# Patient Record
Sex: MALE | Race: WHITE | NOT HISPANIC OR LATINO | Employment: FULL TIME | URBAN - NONMETROPOLITAN AREA
[De-identification: names, ages, dates, MRNs, and addresses within clinical notes are randomized per-mention and may not be internally consistent; named-entity substitution may affect disease eponyms.]

---

## 2023-06-14 ENCOUNTER — NON-PROVIDER VISIT (OUTPATIENT)
Dept: URGENT CARE | Facility: PHYSICIAN GROUP | Age: 43
End: 2023-06-14

## 2023-06-14 DIAGNOSIS — Z02.1 PRE-EMPLOYMENT DRUG SCREENING: ICD-10-CM

## 2023-06-14 LAB
AMP AMPHETAMINE: NORMAL
COC COCAINE: NORMAL
INT CON NEG: NORMAL
INT CON POS: NORMAL
MET METHAMPHETAMINES: NORMAL
OPI OPIATES: NORMAL
PCP PHENCYCLIDINE: NORMAL
POC DRUG COMMENT 753798-OCCUPATIONAL HEALTH: NORMAL
THC: NORMAL

## 2023-06-14 PROCEDURE — 80305 DRUG TEST PRSMV DIR OPT OBS: CPT | Performed by: PHYSICIAN ASSISTANT

## 2023-06-28 ENCOUNTER — OCCUPATIONAL MEDICINE (OUTPATIENT)
Dept: URGENT CARE | Facility: PHYSICIAN GROUP | Age: 43
End: 2023-06-28
Payer: COMMERCIAL

## 2023-06-28 VITALS
WEIGHT: 165 LBS | SYSTOLIC BLOOD PRESSURE: 120 MMHG | OXYGEN SATURATION: 97 % | DIASTOLIC BLOOD PRESSURE: 82 MMHG | HEART RATE: 61 BPM | RESPIRATION RATE: 14 BRPM | HEIGHT: 71 IN | TEMPERATURE: 97.8 F | BODY MASS INDEX: 23.1 KG/M2
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
## 2023-06-28 DIAGNOSIS — K08.409 LOSS OF SINGLE TOOTH: ICD-10-CM

## 2023-06-28 PROCEDURE — 3079F DIAST BP 80-89 MM HG: CPT | Performed by: STUDENT IN AN ORGANIZED HEALTH CARE EDUCATION/TRAINING PROGRAM

## 2023-06-28 PROCEDURE — 3074F SYST BP LT 130 MM HG: CPT | Performed by: STUDENT IN AN ORGANIZED HEALTH CARE EDUCATION/TRAINING PROGRAM

## 2023-06-28 PROCEDURE — 99203 OFFICE O/P NEW LOW 30 MIN: CPT | Performed by: STUDENT IN AN ORGANIZED HEALTH CARE EDUCATION/TRAINING PROGRAM

## 2023-06-28 NOTE — LETTER
"   [facility] [address] [place] [address]  Phone:  [phone] - Fax:  [phone]   [facility] Progress Report and Disability Certification  Date of Service: 6/28/2023   No Show:  No  Date / Time of Next Visit:     Claim Information   Patient Name: Keith Brian  Claim Number:     Employer: [employer]  Date of Injury: 6/26/2023     Insurer / TPA: [facility]  ID / SSN:     Occupation:   Diagnosis: Diagnoses of Loss of single tooth and Work related injury were pertinent to this visit.    Medical Information   Related to Industrial Injury?      Subjective Complaints:  DOI: 6/26/23  NISSA: Box hit tooth and tooth \"popped out\"  Patient reports pain on proximal palate and to lift in place on shelf.  Patient states corner of the box hit left lower jaw causing tooth to pop out.  Patient does have tenderness to area that box hit.  Denies any fever/chill. No drainage from tooth. No second job. No prior injury.   Objective Findings: Lips: Pink.   Mouth: Mucous membranes are moist.   Dentition: Abnormal dentition. Gingival swelling and dental caries present. Missing R lateral incisor.   Pharynx: Oropharynx is clear. Uvula midline.      Pre-Existing Condition(s):     Assessment:   Initial Visit    Status: Discharged / Care Transfer  Permanent Disability:     Plan:      Diagnostics:      Comments:       Disability Information   Status: Released to Full Duty    From:  6/28/2023  Through:   Restrictions are:     Physical Restrictions   Sitting:    Standing:    Stooping:    Bending:      Squatting:    Walking:    Climbing:    Pushing:      Pulling:    Other:    Reaching Above Shoulder (L):   Reaching Above Shoulder (R):       Reaching Below Shoulder (L):    Reaching Below Shoulder (R):      Not to exceed Weight Limits   Carrying(hrs):   Weight Limit(lb):   Lifting(hrs):   Weight  Limit(lb):     Comments: Recommend mouth guard. Transfer of care " to occupational medicine.  Referral placed for occupational medicine and dentistry.  Supportive care measures discussed.    Repetitive Actions   Hands: i.e. Fine Manipulations from Grasping:     Feet: i.e. Operating Foot Controls:     Driving / Operate Machinery:     Health Care Provider’s Original or Electronic Signature  Catherine Gonsalves P.A.-C. Health Care Provider’s Original or Electronic Signature    Amadou Jay DO MPH     Clinic Name / Location: [address] [address] Clinic Phone Number: Dept: [phone]   Appointment Time: 11:45 Am Visit Start Time: 12:33 PM   Check-In Time:  11:59 Am Visit Discharge Time: 12:55 PM   Original-Treating Physician or Chiropractor    Page 2-Insurer/TPA    Page 3-Employer    Page 4-Employee

## 2023-06-28 NOTE — LETTER
"EMPLOYEE’S CLAIM FOR COMPENSATION/ REPORT OF INITIAL TREATMENT  FORM C-4  PLEASE TYPE OR PRINT    EMPLOYEE’S CLAIM - PROVIDE ALL INFORMATION REQUESTED   First Name  Keith Last Name  Snehal Birthdate                    1980                Sex  male Claim Number (Insurer’s Use Only)   Home Address  [place] Age  42 y.o. Height  1.803 m (5' 11\") Weight  74.8 kg (165 lb) [facility]     [facility] Zip  [address] Telephone  There are no phone numbers on file.   Mailing Address  [address] [facility] Zip  [address] Primary Language Spoken  English    INSURER   THIRD-PARTY     [employer]   Employee's Occupation (Job Title) When Injury or Occupational Disease Occurred      Employer's Name/Company Name  [employer]  Telephone  [phone]    Office Mail Address (Number and Street)  [address]        Date of Injury  6/26/2023               Hours Injury  5:20 PM Date Employer Notified  6/26/2023 Last Day of Work after Injury or Occupational Disease  6/28/2023 Supervisor to Whom Injury     Reported  Justice Weber   Address or Location of Accident (if applicable)  Work [1]   What were you doing at the time of accident? (if applicable)  Lifting box onto pallet    How did this injury or occupational disease occur? (Be specific and answer in detail. Use additional sheet if necessary)  I picked u 60lbs box to put on 5ft pallet, The corner of the box hit my face chin, I tasted blood then spit my tooth out.   If you believe that you have an occupational disease, when did you first have knowledge of the disability and its relationship to your employment?  NA Witnesses to the Accident (if applicable)  [place] (Coworker) Justice Wiseman      Nature of Injury or Occupational Disease  Workers' Compensation  Part(s) of Body Injured or Affected  Teeth, Mouth, N/A    I CERTIFY THAT THE ABOVE IS TRUE AND " CORRECT TO T HE BEST OF MY KNOWLEDGE AND THAT I HAVE PROVIDED THIS INFORMATION IN ORDER TO OBTAIN THE BENEFITS OF [place], OR [place]).  I HEREBY AUTHORIZE ANY PHYSICIAN, CHIROPRACTOR, SURGEON, PRACTITIONER OR ANY OTHER PERSON, ANY HOSPITAL, INCLUDING [facility] OR [facility], ANY  MEDICAL SERVICE ORGANIZATION, ANY INSURANCE COMPANY, OR OTHER INSTITUTION OR ORGANIZATION TO RELEASE TO EACH OTHER, ANY MEDICAL OR OTHER INFORMATION, INCLUDING BENEFITS PAID OR PAYABLE, PERTINENT TO THIS INJURY OR DISEASE, EXCEPT INFORMATION RELATIVE TO DIAGNOSIS, TREATMENT AND/OR COUNSELING FOR AIDS, PSYCHOLOGICAL CONDITIONS, ALCOHOL OR CONTROLLED SUBSTANCES, FOR WHICH I MUST GIVE SPECIFIC AUTHORIZATION.  A PHOTOSTAT OF THIS AUTHORIZATION SHALL BE VALID AS THE ORIGINAL.       Date 06/28/2023   [facility] Employee’s Original or  *Electronic Signature   THIS REPORT MUST BE COMPLETED AND MAILED WITHIN 3 WORKING DAYS OF TREATMENT   Place  [facility]  Name of Facility  [facility]   Date  6/28/2023 Diagnosis and Description of Injury or Occupational Disease  (K08.409) Loss of single tooth  (Y99.0) Work related injury Is there evidence that the injured employee was under the influence of alcohol and/or another controlled substance at the time of accident?  ? No ? Yes (if yes, please explain)   Hour  12:33 PM   Diagnoses of Loss of single tooth and Work related injury were pertinent to this visit. No   Treatment     Have you advised the patient to remain off work five days or     more?    X-Ray Findings      ? Yes Indicate dates:   From   To      From information given by the employee, together with medical evidence, can        you directly connect this injury or occupational disease as job incurred?  Yes ? No If no, is the injured employee capable of:  ? full duty  Yes ? modified duty      Is additional  "medical care by a physician indicated?  Yes If modified duty, specify any limitations / restrictions      Do you know of any previous injury or disease contributing to this condition or occupational disease?  ? Yes ? No (Explain if yes)                          No   Date  6/28/2023 Print Health Care Provider's  Name  Catherine Gonsalves P.A.-C. I certify that the employer’s copy of  this form was delivered to the employer on:   Address  [address] Insurer’s Use Only     [facility] Zip  [address]    Provider’s Tax ID Number  [id number] Telephone  Dept: [phone]             Health Care Provider’s Original or Electronic Signature  e-CATHERINE Vega P.A.-C. Degree (MD,DO, DC,JOSE EDUARDO,APRN)  JOSE EDUARDO      * Complete and attach Release of Information (Form C-4A) when injured employee signs C-4 Form electronically  ORIGINAL - TREATING HEALTHCARE PROVIDER PAGE 2 - INSURER/TPA PAGE 3 - EMPLOYER PAGE 4 - EMPLOYEE             Form C-4 (rev.08/21)           BRIEF DESCRIPTION OF RIGHTS AND BENEFITS  (Pursuant to [place] 616C.050)    Notice of Injury or Occupational Disease (Incident Report Form C-1): If an injury or occupational disease (OD) arises out of and in the course of employment, you must provide written notice to your employer as soon as practicable, but no later than 7 days after the accident or OD. Your employer shall maintain a sufficient supply of the required forms.    Claim for Compensation (Form C-4): If medical treatment is sought, the form C-4 is available at the place of initial treatment. A completed \"Claim for Compensation\" (Form C-4) must be filed within 90 days after an accident or OD. The treating physician or chiropractor must, within 3 working days after treatment, complete and mail to the employer, the employer's insurer and third-party , the Claim for Compensation.    Medical Treatment: If you require medical treatment for your on-the-job injury or OD, you may be required to " select a physician or chiropractor from a list provided by your workers’ compensation insurer, if it has contracted with an Organization for Managed Care (MCO) or Preferred Provider Organization (PPO) or providers of health care. If your employer has not entered into a contract with an MCO or PPO, you may select a physician or chiropractor from the Panel of Physicians and Chiropractors. Any medical costs related to your industrial injury or OD will be paid by your insurer.    Temporary Total Disability (TTD): If your doctor has certified that you are unable to work for a period of at least 5 consecutive days, or 5 cumulative days in a 20-day period, or places restrictions on you that your employer does not accommodate, you may be entitled to TTD compensation.    Temporary Partial Disability (TPD): If the wage you receive upon reemployment is less than the compensation for TTD to which you are entitled, the insurer may be required to pay you TPD compensation to make up the difference. TPD can only be paid for a maximum of 24 months.    Permanent Partial Disability (PPD): When your medical condition is stable and there is an indication of a PPD as a result of your injury or OD, within 30 days, your insurer must arrange for an evaluation by a rating physician or chiropractor to determine the degree of your PPD. The amount of your PPD award depends on the date of injury, the results of the PPD evaluation, your age and wage.    Permanent Total Disability (PTD): If you are medically certified by a treating physician or chiropractor as permanently and totally disabled and have been granted a PTD status by your insurer, you are entitled to receive monthly benefits not to exceed 66 2/3% of your average monthly wage. The amount of your PTD payments is subject to reduction if you previously received a lump-sum PPD award.    Vocational Rehabilitation Services: You may be eligible for vocational rehabilitation services if you  are unable to return to the job due to a permanent physical impairment or permanent restrictions as a result of your injury or occupational disease.    Transportation and Per Heather Reimbursement: You may be eligible for travel expenses and per heather associated with medical treatment.    Reopening: You may be able to reopen your claim if your condition worsens after claim closure.     Appeal Process: If you disagree with a written determination issued by the insurer or the insurer does not respond to your request, you may appeal to the Department of Administration, , by following the instructions contained in your determination letter. You must appeal the determination within 70 days from the date of the determination letter at [address], [address], [place], [place] [address], or [address], [address], [place], [place] [address]. If you disagree with the  decision, you may appeal to the Department of Administration, . You must file your appeal within 30 days from the date of the  decision letter at [address], [address], [place], [place] [address], or [address], [facility] [place] [address], [place] [address]. If you disagree with a decision of an , you may file a petition for judicial review with the District Court. You must do so within 30 days of the Appeal Officer’s decision. You may be represented by an  at your own expense or you may contact the [facility] for possible representation.    [place]  for Injured Workers (NAIW): If you disagree with a  decision, you may request that NAIW represent you without charge at an  Hearing. For information regarding denial of benefits, you may contact the [facility] at: [address], [address] [address], [phone], or [address], [address], [place] [address], [phone]    To File a Complaint with  the Division: If you wish to file a complaint with the  of the Division of Industrial Relations (DIR),  please contact the Workers’ Compensation Section, [address], [address], [place], [place] [address], telephone ([phone], or [address], [address], [place], [place] [address], telephone [phone].    For assistance with Workers’ Compensation Issues: You may contact the [facility] Office for Consumer Health Assistance, [address], [address], [place], [place] [address], Toll Free 1-654.208.4655, Web site: http://[facility].[place].gov/Programs/ANALIA E-mail: analia@[facility].nv.gov              __________________________________________________________________                                    ___06/28/2023______            Employee Name / Signature                                                                                                                            Date                                                                                                                                                                                                                              D-2 (rev. 10/20)

## 2023-06-28 NOTE — PROGRESS NOTES
"Caren Brian is a 42 y.o. male who presents with Work-Related Injury (Lost tooth )            DOI: 6/26/23  NISSA: Box hit tooth and tooth \"popped out\"  Patient reports pain on proximal palate and to lift in place on shelf.  Patient states corner of the box hit left lower jaw causing tooth to pop out.  Patient does have tenderness to area that box hit.  Denies any fever/chill. No drainage from tooth. No second job. No prior injury.      HPI    ROS           Objective     BP (!) 120/92   Pulse (!) 51   Temp 36.6 °C (97.8 °F) (Temporal)   Resp 14   Ht 1.803 m (5' 11\")   Wt 74.8 kg (165 lb)   SpO2 97%   BMI 23.01 kg/m²      Physical Exam  Vitals reviewed.   Constitutional:       Appearance: Normal appearance.   HENT:      Head: Normocephalic and atraumatic.      Mouth/Throat:      Lips: Pink.      Mouth: Mucous membranes are moist.      Dentition: Abnormal dentition. Gingival swelling and dental caries present.      Pharynx: Oropharynx is clear. Uvula midline.        Comments: Missing R lateral incisor.  Cardiovascular:      Rate and Rhythm: Normal rate and regular rhythm.   Pulmonary:      Effort: Pulmonary effort is normal.      Breath sounds: Normal breath sounds.   Neurological:      Mental Status: He is alert.                           Assessment & Plan        1. Loss of single tooth  - Loss of right lateral incisor.  - Referral to Dentistry  - Referral to Occupational Medicine    2. Work related injury  - Referral to Dentistry  - Referral to Occupational Medicine     C4 and D39 completed.  Released to full duty.  Recommend mouth guard.  Transfer of care to occupational medicine and dentistry.  Referrals placed.    Supportive care measures (ice, OTC Tylenol/ibuprofen) and indications for immediate follow-up discussed with patient. Pathogenesis of diagnosis discussed including typical length and natural progression.      Instructed to return to urgent care or nearest emergency department if " symptoms fail to improve, for any change in condition, further concerns, or new concerning symptoms.    Patient states understanding and agrees with the plan of care and discharge instructions.

## 2023-07-07 ENCOUNTER — OCCUPATIONAL MEDICINE (OUTPATIENT)
Dept: OCCUPATIONAL MEDICINE | Facility: CLINIC | Age: 43
End: 2023-07-07
Payer: COMMERCIAL

## 2023-07-07 VITALS
OXYGEN SATURATION: 96 % | SYSTOLIC BLOOD PRESSURE: 116 MMHG | BODY MASS INDEX: 23.01 KG/M2 | HEART RATE: 67 BPM | DIASTOLIC BLOOD PRESSURE: 70 MMHG | RESPIRATION RATE: 14 BRPM | WEIGHT: 165 LBS | TEMPERATURE: 98.5 F

## 2023-07-07 DIAGNOSIS — K08.409 LOSS OF SINGLE TOOTH: ICD-10-CM
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
## 2023-07-07 PROCEDURE — 3078F DIAST BP <80 MM HG: CPT | Performed by: NURSE PRACTITIONER

## 2023-07-07 PROCEDURE — 3074F SYST BP LT 130 MM HG: CPT | Performed by: NURSE PRACTITIONER

## 2023-07-07 PROCEDURE — 99213 OFFICE O/P EST LOW 20 MIN: CPT | Performed by: NURSE PRACTITIONER

## 2023-07-07 NOTE — PROGRESS NOTES
"Subjective:     Keith Brian is a 42 y.o. male who presents for No chief complaint on file.      HPI  Pt presents for evaluation of an existing work comp injury.  This is his first follow-up visit since his injury.  Copied from previous visit:    DOI: 6/26/23  NISSA: Box hit tooth and tooth \"popped out\"  Patient reports pain on proximal palate and to lift in place on shelf.  Patient states corner of the box hit left lower jaw causing tooth to pop out.  Patient does have tenderness to area that box hit.  Denies any fever/chill. No drainage from tooth. No second job. No prior injury.    Follow-up visit #1, 7/7/2023: Patient no longer endorses pain but does note mild pressure to chin and area where tooth was lost.  He has not used any ibuprofen or Tylenol for relief of his discomfort.  He is back to work without any restrictions.  He is tolerating this well.  ROS    PMH: No past medical history on file.  ALLERGIES: No Known Allergies  SURGHX: No past surgical history on file.  SOCHX:   Social History     Socioeconomic History    Marital status: Single   Tobacco Use    Smoking status: Every Day     Types: Cigarettes    Smokeless tobacco: Never   Substance and Sexual Activity    Alcohol use: Never    Drug use: Yes     Types: Marijuana     FH: No family history on file.      Objective:   There were no vitals taken for this visit.    Physical Exam  Vitals and nursing note reviewed.   Constitutional:       General: He is not in acute distress.     Appearance: Normal appearance. He is normal weight. He is not ill-appearing or toxic-appearing.   HENT:      Head: Normocephalic.      Right Ear: External ear normal.      Left Ear: External ear normal.      Nose: Nose normal.      Mouth/Throat:      Mouth: Mucous membranes are moist.      Dentition: Abnormal dentition. Does not have dentures. No gingival swelling or dental abscesses.        Comments: Lateral incisor #26 missing  Eyes:      General:         Right eye: No discharge.    "      Left eye: No discharge.      Extraocular Movements: Extraocular movements intact.      Conjunctiva/sclera: Conjunctivae normal.      Pupils: Pupils are equal, round, and reactive to light.   Pulmonary:      Effort: Pulmonary effort is normal.      Breath sounds: Normal breath sounds.   Abdominal:      General: Abdomen is flat.   Musculoskeletal:         General: Normal range of motion.      Cervical back: Normal range of motion and neck supple. No rigidity.   Lymphadenopathy:      Cervical: No cervical adenopathy.   Skin:     General: Skin is warm and dry.   Neurological:      General: No focal deficit present.      Mental Status: He is alert and oriented to person, place, and time. Mental status is at baseline.   Psychiatric:         Mood and Affect: Mood normal.         Behavior: Behavior normal.         Judgment: Judgment normal.         Assessment/Plan:   Assessment    1. Loss of single tooth        2. Work related injury            Referral for dentistry is in place.  Patient is currently awaiting to hear from dentist office.  He may return to work full max duty with no restrictions.  Patient to follow-up in 2 weeks.  Follow-up sooner for signs and symptoms of infection.

## 2023-07-07 NOTE — LETTER
"[address],   [address] [place] [address]  Phone:  [phone] - Fax:  [phone]   Occupational Health [facility] Progress Report and Disability Certification  Date of Service: 7/7/2023   No Show:  No  Date / Time of Next Visit:  @2:30 PM   Claim Information   Patient Name: Keith Brian  Claim Number:     Employer: [employer]  Date of Injury: 6/26/2023     Insurer / TPA: [employer]  ID / SSN:     Occupation:   Diagnosis: Diagnoses of Loss of single tooth and Work related injury were pertinent to this visit.    Medical Information   Related to Industrial Injury? Yes    Subjective Complaints:  Pt presents for evaluation of an existing work comp injury.  This is his first follow-up visit since his injury.  Copied from previous visit:    DOI: 6/26/23  NISSA: Box hit tooth and tooth \"popped out\"  Patient reports pain on proximal palate and to lift in place on shelf.  Patient states corner of the box hit left lower jaw causing tooth to pop out.  Patient does have tenderness to area that box hit.  Denies any fever/chill. No drainage from tooth. No second job. No prior injury.    Follow-up visit #1, 7/7/2023: Patient no longer endorses pain but does note mild pressure to chin and area where tooth was lost.  He has not used any ibuprofen or Tylenol for relief of his discomfort.  He is back to work without any restrictions.  He is tolerating this well.   Objective Findings: Dentition: Abnormal dentition. Does not have dentures. No gingival swelling or dental abscesses.    Comments: Lateral incisor #26 missing     Pre-Existing Condition(s):     Assessment:   Condition Same    Status: Additional Care Required  Permanent Disability:No    Plan:      Diagnostics:      Comments:       Disability Information   Status: Released to Full Duty    From:     Through:   Restrictions are:     Physical Restrictions   Sitting:    Standing:    Stooping:    Bending:   "    Squatting:    Walking:    Climbing:    Pushing:      Pulling:    Other:    Reaching Above Shoulder (L):   Reaching Above Shoulder (R):       Reaching Below Shoulder (L):    Reaching Below Shoulder (R):      Not to exceed Weight Limits   Carrying(hrs):   Weight Limit(lb):   Lifting(hrs):   Weight  Limit(lb):     Comments: Patient follow-up in 2 weeks or sooner for signs and symptoms of infection.    Repetitive Actions   Hands: i.e. Fine Manipulations from Grasping:     Feet: i.e. Operating Foot Controls:     Driving / Operate Machinery:     Health Care Provider’s Original or Electronic Signature  MINI Jameson Health Care Provider’s Original or Electronic Signature    Amadou Jay DO MPH     Clinic Name / Location: [address] [place] [address] Clinic Phone Number: Dept: [phone]   Appointment Time: 1:30 Pm Visit Start Time: 1:45 PM   Check-In Time:  1:17 Pm Visit Discharge Time:  3:18 PM   Original-Treating Physician or Chiropractor    Page 2-Insurer/TPA    Page 3-Employer    Page 4-Employee

## 2023-07-07 NOTE — LETTER
"[address],   [address] [address]  Phone:  [phone] - Fax:  [phone]   Occupational Health [facility] Progress Report and Disability Certification  Date of Service: 7/7/2023   No Show:  No  Date / Time of Next Visit:  07/21/2023   Claim Information   Patient Name: Keith Brian  Claim Number:     Employer: [employer]  Date of Injury: 6/26/2023     Insurer / TPA: [employer]  ID / SSN:     Occupation:   Diagnosis: Diagnoses of Loss of single tooth and Work related injury were pertinent to this visit.    Medical Information   Related to Industrial Injury? Yes    Subjective Complaints:  Pt presents for evaluation of an existing work comp injury.  This is his first follow-up visit since his injury.  Copied from previous visit:    DOI: 6/26/23  NISSA: Box hit tooth and tooth \"popped out\"  Patient reports pain on proximal palate and to lift in place on shelf.  Patient states corner of the box hit left lower jaw causing tooth to pop out.  Patient does have tenderness to area that box hit.  Denies any fever/chill. No drainage from tooth. No second job. No prior injury.    Follow-up visit #1, 7/7/2023: Patient no longer endorses pain but does note mild pressure to chin and area where tooth was lost.  He has not used any ibuprofen or Tylenol for relief of his discomfort.  He is back to work without any restrictions.  He is tolerating this well.   Objective Findings: Dentition: Abnormal dentition. Does not have dentures. No gingival swelling or dental abscesses.    Comments: Lateral incisor #26 missing     Pre-Existing Condition(s):     Assessment:   Condition Same    Status: Additional Care Required  Permanent Disability:No    Plan:      Diagnostics:      Comments:       Disability Information   Status: Released to Full Duty    From:     Through:   Restrictions are:     Physical Restrictions   Sitting:    Standing:    Stooping:    " Bending:      Squatting:    Walking:    Climbing:    Pushing:      Pulling:    Other:    Reaching Above Shoulder (L):   Reaching Above Shoulder (R):       Reaching Below Shoulder (L):    Reaching Below Shoulder (R):      Not to exceed Weight Limits   Carrying(hrs):   Weight Limit(lb):   Lifting(hrs):   Weight  Limit(lb):     Comments: Patient follow-up in 2 weeks or sooner for signs and symptoms of infection.    Repetitive Actions   Hands: i.e. Fine Manipulations from Grasping:     Feet: i.e. Operating Foot Controls:     Driving / Operate Machinery:     Health Care Provider’s Original or Electronic Signature  MINI Jameson Health Care Provider’s Original or Electronic Signature    Amadou Jay DO MPH     Clinic Name / Location: [place] [address]  [place] [address] Clinic Phone Number: Dept: [phone]   Appointment Time: 1:30 Pm Visit Start Time: 1:45 PM   Check-In Time:  1:17 Pm Visit Discharge Time:  2:16pm   Original-Treating Physician or Chiropractor    Page 2-Insurer/TPA    Page 3-Employer    Page 4-Employee

## 2024-06-07 ENCOUNTER — OFFICE VISIT (OUTPATIENT)
Dept: URGENT CARE | Facility: PHYSICIAN GROUP | Age: 44
End: 2024-06-07
Payer: MEDICAID

## 2024-06-07 VITALS
SYSTOLIC BLOOD PRESSURE: 114 MMHG | DIASTOLIC BLOOD PRESSURE: 60 MMHG | BODY MASS INDEX: 23.31 KG/M2 | WEIGHT: 157.4 LBS | OXYGEN SATURATION: 98 % | TEMPERATURE: 98.4 F | HEART RATE: 52 BPM | HEIGHT: 69 IN | RESPIRATION RATE: 20 BRPM

## 2024-06-07 DIAGNOSIS — S09.93XA PAIN DUE TO DENTAL TRAUMA: ICD-10-CM

## 2024-06-07 DIAGNOSIS — K04.7 DENTAL INFECTION: ICD-10-CM

## 2024-06-07 PROCEDURE — 3074F SYST BP LT 130 MM HG: CPT | Performed by: NURSE PRACTITIONER

## 2024-06-07 PROCEDURE — 3078F DIAST BP <80 MM HG: CPT | Performed by: NURSE PRACTITIONER

## 2024-06-07 PROCEDURE — 99215 OFFICE O/P EST HI 40 MIN: CPT | Performed by: NURSE PRACTITIONER

## 2024-06-07 RX ORDER — HYDROCODONE BITARTRATE AND ACETAMINOPHEN 5; 325 MG/1; MG/1
1 TABLET ORAL EVERY 4 HOURS PRN
Qty: 15 TABLET | Refills: 0 | Status: SHIPPED | OUTPATIENT
Start: 2024-06-07 | End: 2024-06-12

## 2024-06-07 RX ORDER — CEPHALEXIN 500 MG/1
500 CAPSULE ORAL 4 TIMES DAILY
Qty: 28 CAPSULE | Refills: 0 | Status: SHIPPED | OUTPATIENT
Start: 2024-06-07 | End: 2024-06-14

## 2024-06-08 NOTE — PROGRESS NOTES
"Subjective:     Keith Brian is a 43 y.o. male who presents for Dental Injury (Pt sts he had 30 teeth removed today at 1 pm, pt sts he was told to keep the dentures in for 24 hours and they fell out with in a hour, pt has had consistent bleeding  since they removed his teeth )      Patient ongoing    Dental Injury      Pt presents for evaluation of a new problem. Keith is a very pleasant 43-year-old male who presents to urgent care today after having 30 teeth removed and absolute dental.  This was performed approximately 6 hours ago and patient states he has had nonstop bleeding from lower teeth since this time.  Patient notes that he left the dental office with copious amounts of bleeding and was told just to keep his dentures in place.  Patient notes after the last tooth was removed he was discharged 6 minutes later.  His dentures immediately fell out as they are too loose and due to bleeding.  Lidocaine has worn off and he is in significant pain.  Patient was not prescribed any pain medication or antibiotics.  Patient denies any drug abuse but does note that he is an every day smoker.  No use of blood thinners.  ROS    PMH: No past medical history on file.  ALLERGIES: No Known Allergies  SURGHX: No past surgical history on file.  SOCHX:   Social History     Socioeconomic History    Marital status: Single   Tobacco Use    Smoking status: Every Day     Types: Cigarettes    Smokeless tobacco: Never   Substance and Sexual Activity    Alcohol use: Never    Drug use: Yes     Types: Marijuana     FH: No family history on file.      Objective:   /60 (BP Location: Left arm, Patient Position: Sitting, BP Cuff Size: Adult)   Pulse (!) 52   Temp 36.9 °C (98.4 °F) (Temporal)   Resp 20   Ht 1.753 m (5' 9\")   Wt 71.4 kg (157 lb 6.4 oz)   SpO2 98%   BMI 23.24 kg/m²     Physical Exam  Vitals and nursing note reviewed.   Constitutional:       General: He is not in acute distress.     Appearance: Normal appearance. He is " normal weight. He is not ill-appearing or toxic-appearing.   HENT:      Head: Normocephalic.      Right Ear: External ear normal.      Left Ear: External ear normal.      Nose: Nose normal.      Mouth/Throat:      Mouth: Mucous membranes are moist.      Dentition: Abnormal dentition. Dental tenderness present.        Comments: All bottom teeth were removed and there is continuous bloody drainage from all sites.  Additionally, there is an open wound where frenulum was originally in place between midline bottom teeth and gumline  Eyes:      General:         Right eye: No discharge.         Left eye: No discharge.      Extraocular Movements: Extraocular movements intact.      Conjunctiva/sclera: Conjunctivae normal.      Pupils: Pupils are equal, round, and reactive to light.   Pulmonary:      Effort: Pulmonary effort is normal.      Breath sounds: Normal breath sounds.   Abdominal:      General: Abdomen is flat.   Musculoskeletal:         General: Normal range of motion.      Cervical back: Normal range of motion and neck supple. No rigidity.   Lymphadenopathy:      Cervical: No cervical adenopathy.   Skin:     General: Skin is warm and dry.   Neurological:      General: No focal deficit present.      Mental Status: He is alert and oriented to person, place, and time. Mental status is at baseline.   Psychiatric:         Mood and Affect: Mood normal.         Behavior: Behavior normal.         Judgment: Judgment normal.         Assessment/Plan:   Assessment      1. Pain due to dental trauma  HYDROcodone-acetaminophen (NORCO) 5-325 MG Tab per tablet    cephALEXin (KEFLEX) 500 MG Cap      2. Dental infection  cephALEXin (KEFLEX) 500 MG Cap        Hemostasis was achieved with multiple units of Surgicel in clinic today.  4 x 4's were placed at bottom teeth and patient was educated to keep this in place for at least 30 minutes for further clotting.  Due to significant pain and removal of all of his teeth today he was  prescribed hydrocodone for pain management.  Side effects of pain medication discussed with patient.   was reviewed and I do not believe that he is at an increased risk for abuse of this medication.  Pain contract signed.  Additionally, due to open wounds, history of smoking he was empirically started on Keflex.  Patient was strongly encouraged to stop smoking to prevent dry sockets.  Smoking cessation counseling given.  Time spent evaluating this patient was at least 42 minutes and includes preparing for visit, counseling/education, exam and evaluation, obtaining history, independent interpretation and ordering labs/tests/procedures.